# Patient Record
Sex: FEMALE | Race: WHITE | NOT HISPANIC OR LATINO | Employment: OTHER | URBAN - METROPOLITAN AREA
[De-identification: names, ages, dates, MRNs, and addresses within clinical notes are randomized per-mention and may not be internally consistent; named-entity substitution may affect disease eponyms.]

---

## 2018-06-15 ENCOUNTER — HOSPITAL ENCOUNTER (EMERGENCY)
Facility: MEDICAL CENTER | Age: 73
End: 2018-06-15
Attending: EMERGENCY MEDICINE
Payer: MEDICARE

## 2018-06-15 ENCOUNTER — APPOINTMENT (OUTPATIENT)
Dept: RADIOLOGY | Facility: MEDICAL CENTER | Age: 73
End: 2018-06-15
Attending: EMERGENCY MEDICINE
Payer: MEDICARE

## 2018-06-15 VITALS
RESPIRATION RATE: 16 BRPM | BODY MASS INDEX: 24.32 KG/M2 | DIASTOLIC BLOOD PRESSURE: 64 MMHG | HEART RATE: 95 BPM | TEMPERATURE: 98.6 F | WEIGHT: 145.94 LBS | HEIGHT: 65 IN | OXYGEN SATURATION: 96 % | SYSTOLIC BLOOD PRESSURE: 110 MMHG

## 2018-06-15 DIAGNOSIS — S80.01XA CONTUSION OF RIGHT PATELLA, INITIAL ENCOUNTER: ICD-10-CM

## 2018-06-15 DIAGNOSIS — S89.91XA INJURY OF RIGHT KNEE, INITIAL ENCOUNTER: ICD-10-CM

## 2018-06-15 PROCEDURE — 700102 HCHG RX REV CODE 250 W/ 637 OVERRIDE(OP): Performed by: EMERGENCY MEDICINE

## 2018-06-15 PROCEDURE — 99284 EMERGENCY DEPT VISIT MOD MDM: CPT

## 2018-06-15 PROCEDURE — A9270 NON-COVERED ITEM OR SERVICE: HCPCS | Performed by: EMERGENCY MEDICINE

## 2018-06-15 PROCEDURE — 73564 X-RAY EXAM KNEE 4 OR MORE: CPT | Mod: RT

## 2018-06-15 RX ORDER — ASPIRIN 325 MG
325 TABLET ORAL DAILY
COMMUNITY

## 2018-06-15 RX ORDER — HYDROCODONE BITARTRATE AND ACETAMINOPHEN 5; 325 MG/1; MG/1
1 TABLET ORAL ONCE
Status: COMPLETED | OUTPATIENT
Start: 2018-06-15 | End: 2018-06-15

## 2018-06-15 RX ORDER — HYDROCODONE BITARTRATE AND ACETAMINOPHEN 5; 325 MG/1; MG/1
1 TABLET ORAL EVERY 6 HOURS PRN
Qty: 12 TAB | Refills: 0 | Status: SHIPPED | OUTPATIENT
Start: 2018-06-15 | End: 2018-06-18

## 2018-06-15 RX ADMIN — HYDROCODONE BITARTRATE AND ACETAMINOPHEN 1 TABLET: 5; 325 TABLET ORAL at 20:16

## 2018-06-15 ASSESSMENT — PAIN SCALES - WONG BAKER: WONGBAKER_NUMERICALRESPONSE: HURTS A WHOLE LOT

## 2018-06-16 NOTE — DISCHARGE INSTRUCTIONS
Return to the ER for pain, swelling, fever or other concerns.  Use immobilizer and crutches.  Ollow up with orthopedic doctor.        Knee Pain  Knee pain is a very common symptom and can have many causes. Knee pain often goes away when you follow your health care provider's instructions for relieving pain and discomfort at home. However, knee pain can develop into a condition that needs treatment. Some conditions may include:  · Arthritis caused by wear and tear (osteoarthritis).  · Arthritis caused by swelling and irritation (rheumatoid arthritis or gout).  · A cyst or growth in your knee.  · An infection in your knee joint.  · An injury that will not heal.  · Damage, swelling, or irritation of the tissues that support your knee (torn ligaments or tendinitis).  If your knee pain continues, additional tests may be ordered to diagnose your condition. Tests may include X-rays or other imaging studies of your knee. You may also need to have fluid removed from your knee. Treatment for ongoing knee pain depends on the cause, but treatment may include:  · Medicines to relieve pain or swelling.  · Steroid injections in your knee.  · Physical therapy.  · Surgery.  HOME CARE INSTRUCTIONS  · Take medicines only as directed by your health care provider.  · Rest your knee and keep it raised (elevated) while you are resting.  · Do not do things that cause or worsen pain.  · Avoid high-impact activities or exercises, such as running, jumping rope, or doing jumping jacks.  · Apply ice to the knee area:  ¨ Put ice in a plastic bag.  ¨ Place a towel between your skin and the bag.  ¨ Leave the ice on for 20 minutes, 2-3 times a day.  · Ask your health care provider if you should wear an elastic knee support.  · Keep a pillow under your knee when you sleep.  · Lose weight if you are overweight. Extra weight can put pressure on your knee.  · Do not use any tobacco products, including cigarettes, chewing tobacco, or electronic  cigarettes. If you need help quitting, ask your health care provider. Smoking may slow the healing of any bone and joint problems that you may have.  SEEK MEDICAL CARE IF:  · Your knee pain continues, changes, or gets worse.  · You have a fever along with knee pain.  · Your knee liyah or locks up.  · Your knee becomes more swollen.  SEEK IMMEDIATE MEDICAL CARE IF:   · Your knee joint feels hot to the touch.  · You have chest pain or trouble breathing.  This information is not intended to replace advice given to you by your health care provider. Make sure you discuss any questions you have with your health care provider.  Document Released: 10/14/2008 Document Revised: 01/08/2016 Document Reviewed: 08/03/2015  [employer] Interactive Patient Education © 2017 [employer]

## 2018-06-16 NOTE — ED NOTES
Pt given written and oral discharge instructions. Pt verbalized understanding of all instructions given. All questions answered. VSS. Pt signed controlled substance informed consent form. Pt given paper prescription as ordered and educated on use and side effect. Pt given f/u instructions and educated on s/s of when to return to the ER. Pt assisted to car via wheelchair. Pt reminded not to drive as she has received narcotic pain medication. Pt verbalized understanding and family at bedside to drive pt home.

## 2018-06-16 NOTE — ED PROVIDER NOTES
"ED Provider Note    CHIEF COMPLAINT  Chief Complaint   Patient presents with   • Knee Pain     tripped and fell on a rock while playing golf. Rt knee is now swollen and it is hard to move.        HPI  Maranda Mirza is a 73 y.o. female who presents To the emergency department complaining of right knee pain.  The patient tripped on a walk and then fell landing with her knee on a rock.  It hurt initially.  She was able to finish the round of golf.  She also took a nap and then got up and has knee pain.  States it feels like it is locked.  Will not move.  Pain is worsened by movement, and palpation.  No numbness or tingling distally.  Denies any other injuries or complaints.  No previous knee problems.    REVIEW OF SYSTEMS  See HPI for further details.  Musculoskeletal: No other muscular skeletal injuries or complaints.    PAST MEDICAL HISTORY  History reviewed. No pertinent past medical history.    FAMILY HISTORY  History reviewed. No pertinent family history.    SOCIAL HISTORY  Social History     Social History   • Marital status: Single     Spouse name: N/A   • Number of children: N/A   • Years of education: N/A     Social History Main Topics   • Smoking status: Never Smoker   • Smokeless tobacco: Never Used   • Alcohol use No   • Drug use: No   • Sexual activity: Not on file     Other Topics Concern   • Not on file     Social History Narrative   • No narrative on file       SURGICAL HISTORY  Past Surgical History:   Procedure Laterality Date   • OTHER NEUROLOGICAL SURG      coils placed in brain for aneurysm.        CURRENT MEDICATIONS  Home Medications    **Home medications have not yet been reviewed for this encounter**         ALLERGIES  No Known Allergies    PHYSICAL EXAM  VITAL SIGNS: /63   Pulse 94   Temp 37.3 °C (99.2 °F)   Resp 18   Ht 1.651 m (5' 5\")   Wt 66.2 kg (145 lb 15.1 oz)   SpO2 95%   BMI 24.29 kg/m²      Constitutional: Well developed, Well nourished, No acute distress, Non-toxic " appearance.   HENT: Normocephalic, Atraumatic,   Musculoskeletal: Good range of motion in all major joints.  Tenderness of the right knee.  No significant effusion.  No joint tenderness.  Good pulses.  Normal neurovascular examination.  Slight abrasion and contusion over the mid all of the kneecap.  Patella tendon functions.  I doubt this is an occult fracture.  Neurologic: Alert & oriented x 3,No focal deficits noted.   Psychiatric: Affect normal,      RADIOLOGY/PROCEDURES  DX-KNEE COMPLETE 4+ RIGHT   Final Result      No evidence of fracture or dislocation.   Mild to moderate osteoarthritis noted in the right knee.            COURSE & MEDICAL DECISION MAKING  Pertinent Labs & Imaging studies reviewed. (See chart for details)    The patient presents with acute right knee pain after a fall.  Clinical exam and history somewhat contusion.  X-ray is negative.    She has significant pain with trying to range her knee.  Therefore, she is placed in an immobilizer and given crutches.  She is a normal neurovascular examination.    This for this.  This is a septic arthritis.  She is normal neurovascular exam.  When she does her care with immobilizer and crutches.  She will be referred to the orthopedic doctor on-call.    She is asking for pain medicines.  She is taking some Tylenol.  They will give her a Norco.    The patient is requesting a pain pill for home.  Prescribed Norco.    In prescribing controlled substances to this patient, I certify that I have obtained and reviewed the medical history of Maranda Mirza. I have also made a good norma effort to obtain applicable records from other providers who have treated the patient and records did not demonstrate any increased risk of substance abuse that would prevent me from prescribing controlled substances.     I have conducted a physical exam and documented it. I have reviewed Ms. Mirza’s prescription history as maintained by the [place] Prescription Monitoring  Program.     I have assessed the patient’s risk for abuse, dependency, and addiction using the validated Opioid Risk Tool available at https://www.mdcalc.com/xgaqpw-jjjo-vale-ort-narcotic-abuse.     Given the above, I believe the benefits of controlled substance therapy outweigh the risks. The reasons for prescribing controlled substances include because the patient has been taking oral pain medications over-the-counter without relief.. Accordingly, I have discussed the risk and benefits, treatment plan, and alternative therapies with the patient.       The patient is presently immobilizer.  She is to keep this in place primarily, but may removed in time to time as needed.  She is not local, so she is advised to see an orthopedic doctor when she gets home.  She is to return to the ER for pain, swelling, numbness, tingling, weakness or other concerns.      Repeat neurovascular exam post-immobilizer is normal.    Strep Norco as above.  Referred to our local orthopedic doctor, but also told to follow-up with orthopedics .  She returns home.    FINAL IMPRESSION  1. Injury of right knee, initial encounter    2. Contusion of right patella, initial encounter    2    2.   3.         Electronically signed by: Jam Manning, 6/15/2018 7:09 PM

## 2018-06-16 NOTE — ED NOTES
Pt reminded not to drive as she has received narcotic pain medication here in the ER today. Pt verbalized understanding.